# Patient Record
Sex: MALE | Race: BLACK OR AFRICAN AMERICAN
[De-identification: names, ages, dates, MRNs, and addresses within clinical notes are randomized per-mention and may not be internally consistent; named-entity substitution may affect disease eponyms.]

---

## 2019-08-14 ENCOUNTER — HOSPITAL ENCOUNTER (EMERGENCY)
Dept: HOSPITAL 61 - ER | Age: 64
Discharge: HOME | End: 2019-08-14
Payer: COMMERCIAL

## 2019-08-14 VITALS — HEIGHT: 66 IN | WEIGHT: 185 LBS | BODY MASS INDEX: 29.73 KG/M2

## 2019-08-14 VITALS
DIASTOLIC BLOOD PRESSURE: 100 MMHG | SYSTOLIC BLOOD PRESSURE: 165 MMHG | SYSTOLIC BLOOD PRESSURE: 165 MMHG | DIASTOLIC BLOOD PRESSURE: 100 MMHG
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
## 2019-08-14 DIAGNOSIS — M47.816: ICD-10-CM

## 2019-08-14 DIAGNOSIS — M54.5: Primary | ICD-10-CM
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
## 2019-08-14 DIAGNOSIS — G89.11: ICD-10-CM

## 2019-08-14 PROCEDURE — 72131 CT LUMBAR SPINE W/O DYE: CPT
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
## 2019-09-08 ENCOUNTER — HOSPITAL ENCOUNTER (EMERGENCY)
Dept: HOSPITAL 61 - ER | Age: 64
Discharge: LEFT BEFORE DISCHARGE | End: 2019-09-08
Payer: COMMERCIAL

## 2019-09-08 DIAGNOSIS — Z53.21: ICD-10-CM

## 2019-09-08 DIAGNOSIS — R10.9: Primary | ICD-10-CM
